# Patient Record
Sex: FEMALE | Race: WHITE | NOT HISPANIC OR LATINO | Employment: UNEMPLOYED | URBAN - NONMETROPOLITAN AREA
[De-identification: names, ages, dates, MRNs, and addresses within clinical notes are randomized per-mention and may not be internally consistent; named-entity substitution may affect disease eponyms.]

---
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
## 2024-02-27 ENCOUNTER — HOSPITAL ENCOUNTER (EMERGENCY)
Facility: HOSPITAL | Age: 19
Discharge: HOME | End: 2024-02-27
Payer: COMMERCIAL

## 2024-02-27 VITALS
HEART RATE: 91 BPM | BODY MASS INDEX: 18.62 KG/M2 | SYSTOLIC BLOOD PRESSURE: 129 MMHG | TEMPERATURE: 97.1 F | WEIGHT: 133 LBS | HEIGHT: 71 IN | OXYGEN SATURATION: 100 % | RESPIRATION RATE: 16 BRPM | DIASTOLIC BLOOD PRESSURE: 91 MMHG

## 2024-02-27 DIAGNOSIS — H61.23 BILATERAL IMPACTED CERUMEN: ICD-10-CM

## 2024-02-27 DIAGNOSIS — J32.9 SINUSITIS: ICD-10-CM

## 2024-02-27 DIAGNOSIS — H66.91 ACUTE RIGHT OTITIS MEDIA: ICD-10-CM

## 2024-02-27 DIAGNOSIS — J32.9 SINUSITIS, UNSPECIFIED CHRONICITY, UNSPECIFIED LOCATION: Primary | ICD-10-CM

## 2024-02-27 PROCEDURE — G0463 HOSPITAL OUTPT CLINIC VISIT: HCPCS

## 2024-02-27 PROCEDURE — 99213 OFFICE O/P EST LOW 20 MIN: CPT

## 2024-02-27 RX ORDER — FLUTICASONE PROPIONATE 50 MCG
1 SPRAY, SUSPENSION (ML) NASAL DAILY
Qty: 16 G | Refills: 0 | Status: SHIPPED | OUTPATIENT
Start: 2024-02-27 | End: 2024-02-27

## 2024-02-27 RX ORDER — FLUTICASONE PROPIONATE 50 MCG
1 SPRAY, SUSPENSION (ML) NASAL DAILY
Qty: 16 G | Refills: 0 | Status: SHIPPED | OUTPATIENT
Start: 2024-02-27

## 2024-02-27 ASSESSMENT — ENCOUNTER SYMPTOMS
ACTIVITY CHANGE: 0
CHILLS: 1
APPETITE CHANGE: 1
SHORTNESS OF BREATH: 0
FEVER: 0
SORE THROAT: 0
NAUSEA: 0
VOMITING: 0
COUGH: 1
DIARRHEA: 0
ABDOMINAL PAIN: 0
SINUS PAIN: 1
SINUS PRESSURE: 1

## 2024-02-27 ASSESSMENT — ACTIVITIES OF DAILY LIVING (ADL): ADLS_ACUITY_SCORE: 35

## 2024-02-27 NOTE — Clinical Note
Екатерина Mccann was seen and treated in our emergency department on 2/27/2024.  She may return to work on 03/01/2024.       If you have any questions or concerns, please don't hesitate to call.      Brissa Snowden, NP

## 2024-02-28 NOTE — ED PROVIDER NOTES
History     Chief Complaint   Patient presents with    Sinusitis     HPI  Oroz Mccann is a 19 year old female who presents to the urgent care with a 7 day history of sinus pressure, headaches, bilateral ear pain, chills, and decreased oral intake. She denies fevers, abd pain, and n/v/d. Has been attempting dayquil, ibuprofen, nasal spray, and homeopathic medicines with minimal change. No recent abx. Non smoker. No hx of asthma or COPD.      Allergies:  Allergies   Allergen Reactions    Mtx Topical Pain [Lidocaine-Menthol]        Problem List:    Patient Active Problem List    Diagnosis Date Noted    Other fatigue 03/31/2021     Priority: Medium     Formatting of this note might be different from the original.  Iron deficiency      Iron deficiency anemia 03/10/2021     Priority: Medium    Granuloma annulare 03/04/2008     Priority: Medium     Overview:   IMO Update 10/11          Past Medical History:    Past Medical History:   Diagnosis Date    Essential hypertension 3/9/2021       Past Surgical History:    Past Surgical History:   Procedure Laterality Date    ENT SURGERY      bilatral pe tubes        Family History:    No family history on file.    Social History:  Marital Status:  Single [1]  Social History     Tobacco Use    Smoking status: Never    Smokeless tobacco: Never   Vaping Use    Vaping Use: Never used   Substance Use Topics    Alcohol use: No    Drug use: No        Medications:    amoxicillin-clavulanate (AUGMENTIN) 875-125 MG tablet  ferrous sulfate (FE TABS) 325 (65 Fe) MG EC tablet  fluticasone (FLONASE) 50 MCG/ACT nasal spray  norgestimate-ethinyl estradiol (ORTHO-CYCLEN) 0.25-35 MG-MCG tablet          Review of Systems   Constitutional:  Positive for appetite change and chills. Negative for activity change and fever.   HENT:  Positive for congestion, ear pain, sinus pressure and sinus pain. Negative for sore throat.    Respiratory:  Positive for cough. Negative for shortness of  "breath.    Gastrointestinal:  Negative for abdominal pain, diarrhea, nausea and vomiting.   All other systems reviewed and are negative.      Physical Exam   BP: 129/91  Pulse: 91  Temp: 97.1  F (36.2  C)  Resp: 16  Height: 180.3 cm (5' 11\")  Weight: 60.3 kg (133 lb)  SpO2: 100 %      Physical Exam  Vitals and nursing note reviewed.   Constitutional:       General: She is not in acute distress.     Appearance: Normal appearance. She is ill-appearing. She is not toxic-appearing or diaphoretic.   HENT:      Right Ear: There is impacted cerumen. Tympanic membrane is erythematous.      Left Ear: There is impacted cerumen.      Nose:      Right Sinus: Maxillary sinus tenderness and frontal sinus tenderness present.      Left Sinus: Maxillary sinus tenderness and frontal sinus tenderness present.      Mouth/Throat:      Mouth: Mucous membranes are moist.      Pharynx: Oropharynx is clear. No oropharyngeal exudate or posterior oropharyngeal erythema.   Cardiovascular:      Rate and Rhythm: Normal rate and regular rhythm.      Pulses: Normal pulses.      Heart sounds: Normal heart sounds.   Pulmonary:      Effort: Pulmonary effort is normal. No respiratory distress.      Breath sounds: Normal breath sounds. No stridor. No wheezing, rhonchi or rales.   Neurological:      Mental Status: She is alert.         ED Course        Procedures           No results found for this or any previous visit (from the past 24 hour(s)).    Medications - No data to display    Assessments & Plan (with Medical Decision Making)     I have reviewed the nursing notes.    I have reviewed the findings, diagnosis, plan and need for follow up with the patient.  Екатерина Gunnbridgettibbiana is a 19 year old female who presents to the urgent care with a 7 day history of sinus pressure, headaches, bilateral ear pain, chills, and decreased oral intake. She denies fevers, abd pain, and n/v/d. Has been attempting dayquil, ibuprofen, nasal spray, and homeopathic " medicines with minimal change. No recent abx. Non smoker. No hx of asthma or COPD.      MDM: vital signs normal, afebrile. Lungs clear, heart tones regular. Bilateral cerumen impaction enough cerumen removed to minimally visualize TMs. Erythema to right. Bilateral frontal and maxillary sinus tenderness. Discussed viral and bacterial etiologies. At this time, most consistent with viral given her length of symptoms. She declines covid multiplex testing. Flonase and Augmentin prescribed. Supportive measures and return precautions discussed. She is in agreement with plan.     (J32.9) Sinusitis, (H61.23) Bilateral impacted cerumen, (J32.9) Sinusitis,(H66.91) Acute right otitis media  Plan: Flonase nasal spray once daily for 5 days. Antibiotics twice daily for 7 days. Yogurt or probiotic while taking.   Push fluids.   Tylenol and ibuprofen as needed.   Return with any concerns. Follow up in the clinic as needed. Understanding verbalized.     Current Discharge Medication List        START taking these medications    Details   amoxicillin-clavulanate (AUGMENTIN) 875-125 MG tablet Take 1 tablet by mouth 2 times daily  Qty: 14 tablet, Refills: 0      fluticasone (FLONASE) 50 MCG/ACT nasal spray Spray 1 spray into both nostrils daily  Qty: 16 g, Refills: 0             Final diagnoses:   Sinusitis   Bilateral impacted cerumen   Acute right otitis media       2/27/2024   [facility]       Brissa Snowden NP  02/27/24 1956

## 2024-02-28 NOTE — ED TRIAGE NOTES
Patient presents to urgent care for sinus pressure, behind her eyes, her ears hurt, her head hurts for 4 days.   Patient refused Strep & multiplex testing tonight at this visit.

## 2024-02-28 NOTE — DISCHARGE INSTRUCTIONS
Flonase nasal spray once daily for 5 days. Antibiotics twice daily for 7 days. Yogurt or probiotic while taking.   Push fluids.   Tylenol and ibuprofen as needed.   Return with any concerns. Follow up in the clinic as needed.

## 2024-03-08 ENCOUNTER — HOSPITAL ENCOUNTER (EMERGENCY)
Facility: HOSPITAL | Age: 19
Discharge: HOME | End: 2024-03-08
Attending: PHYSICIAN ASSISTANT | Admitting: PHYSICIAN ASSISTANT
Payer: COMMERCIAL

## 2024-03-08 VITALS
WEIGHT: 133 LBS | BODY MASS INDEX: 18.62 KG/M2 | SYSTOLIC BLOOD PRESSURE: 135 MMHG | RESPIRATION RATE: 16 BRPM | OXYGEN SATURATION: 97 % | HEART RATE: 89 BPM | HEIGHT: 71 IN | DIASTOLIC BLOOD PRESSURE: 85 MMHG | TEMPERATURE: 97.3 F

## 2024-03-08 DIAGNOSIS — L27.0 DRUG ERUPTION: ICD-10-CM

## 2024-03-08 LAB
GROUP A STREP BY PCR: NOT DETECTED
HOLD SPECIMEN: NORMAL
MONOCYTES NFR BLD AUTO: NEGATIVE %

## 2024-03-08 PROCEDURE — G0463 HOSPITAL OUTPT CLINIC VISIT: HCPCS

## 2024-03-08 PROCEDURE — 87651 STREP A DNA AMP PROBE: CPT | Performed by: PHYSICIAN ASSISTANT

## 2024-03-08 PROCEDURE — 36415 COLL VENOUS BLD VENIPUNCTURE: CPT | Performed by: PHYSICIAN ASSISTANT

## 2024-03-08 PROCEDURE — 99213 OFFICE O/P EST LOW 20 MIN: CPT | Performed by: PHYSICIAN ASSISTANT

## 2024-03-08 PROCEDURE — 86308 HETEROPHILE ANTIBODY SCREEN: CPT | Performed by: PHYSICIAN ASSISTANT

## 2024-03-08 RX ORDER — PREDNISONE 10 MG/1
TABLET ORAL
Qty: 7 TABLET | Refills: 0 | Status: SHIPPED | OUTPATIENT
Start: 2024-03-08 | End: 2024-03-14

## 2024-03-08 ASSESSMENT — ACTIVITIES OF DAILY LIVING (ADL): ADLS_ACUITY_SCORE: 35

## 2024-03-08 NOTE — DISCHARGE INSTRUCTIONS
Take your claritin 1 tablet daily until rash has cleared.   Take the Prednisone as prescribed to help clear the rash.  May also take Benadryl as directed for itching.   Return here with any new or worsening symptoms including oral swelling, wheezing, or difficulty breathing.

## 2024-03-08 NOTE — ED TRIAGE NOTES
Patient presents to urgent care for rash all over her body. Patient was seen for a ear infection 2/27 and was started on augmentin 875-125. Patient noticed a rash 2 days ago and it spread quickly per patient.  Patient reports it itches between the breast and around her neck area. Patient stated she did change her deodorant and noticed a rash under her right armpit.

## 2024-03-08 NOTE — ED PROVIDER NOTES
"  History     Chief Complaint   Patient presents with    Rash     HPI  Екатерина Mccann is a 19 year old female who presents with rash that began in her armpits and now is over her entire body x since yesterday. She has completed 9/10 days of treatment for ear infection with Augmentin. Denies oral swelling, wheezing, or difficulty breathing. Rash is slightly itchy.     Allergies:  Allergies   Allergen Reactions    Mtx Topical Pain [Lidocaine-Menthol]     Augmentin [Amoxicillin-Pot Clavulanate] Rash       Problem List:    Patient Active Problem List    Diagnosis Date Noted    Other fatigue 03/31/2021     Priority: Medium     Formatting of this note might be different from the original.  Iron deficiency      Iron deficiency anemia 03/10/2021     Priority: Medium    Granuloma annulare 03/04/2008     Priority: Medium     Overview:   IMO Update 10/11          Past Medical History:    Past Medical History:   Diagnosis Date    Essential hypertension 3/9/2021       Past Surgical History:    Past Surgical History:   Procedure Laterality Date    ENT SURGERY      bilatral pe tubes        Family History:    No family history on file.    Social History:  Marital Status:  Single [1]  Social History     Tobacco Use    Smoking status: Never    Smokeless tobacco: Never   Vaping Use    Vaping Use: Never used   Substance Use Topics    Alcohol use: No    Drug use: No        Medications:    ferrous sulfate (FE TABS) 325 (65 Fe) MG EC tablet  fluticasone (FLONASE) 50 MCG/ACT nasal spray  norgestimate-ethinyl estradiol (ORTHO-CYCLEN) 0.25-35 MG-MCG tablet  predniSONE (DELTASONE) 10 MG tablet          Review of Systems   All other systems reviewed and are negative.      Physical Exam   BP: 135/85  Pulse: 89  Temp: 97.3  F (36.3  C)  Resp: 16  Height: 180.3 cm (5' 11\")  Weight: 60.3 kg (133 lb)  SpO2: 97 %      Physical Exam  Vitals and nursing note reviewed.   Constitutional:       General: She is not in acute distress.     " Appearance: She is well-developed. She is not diaphoretic.   HENT:      Head: Normocephalic and atraumatic.      Right Ear: External ear normal. There is impacted cerumen.      Left Ear: External ear normal. There is impacted cerumen.      Nose: Nose normal.      Mouth/Throat:      Pharynx: Oropharynx is clear. Uvula midline. Posterior oropharyngeal erythema present. No pharyngeal swelling or oropharyngeal exudate.      Tonsils: No tonsillar exudate or tonsillar abscesses. 0 on the left.   Eyes:      General: No scleral icterus.        Right eye: No discharge.         Left eye: No discharge.      Conjunctiva/sclera: Conjunctivae normal.      Pupils: Pupils are equal, round, and reactive to light.   Cardiovascular:      Rate and Rhythm: Normal rate and regular rhythm.      Heart sounds: Normal heart sounds. No murmur heard.     No friction rub. No gallop.   Pulmonary:      Effort: Pulmonary effort is normal. No respiratory distress.      Breath sounds: Normal breath sounds. No wheezing or rales.   Chest:      Chest wall: No tenderness.   Musculoskeletal:      Cervical back: Normal range of motion and neck supple.   Lymphadenopathy:      Cervical: No cervical adenopathy.   Skin:     General: Skin is warm and dry.      Coloration: Skin is not pale.      Findings: Rash (1-2mm erythemaous papules scatter on pt from head to toe. Blanches.) present. No erythema.   Neurological:      Mental Status: She is alert and oriented to person, place, and time.      Cranial Nerves: No cranial nerve deficit.      Coordination: Coordination normal.   Psychiatric:         Behavior: Behavior normal.         Thought Content: Thought content normal.         Judgment: Judgment normal.         ED Course        Procedures       Results for orders placed or performed during the hospital encounter of 03/08/24 (from the past 24 hour(s))   Group A Streptococcus PCR Throat Swab    Specimen: Throat; Swab   Result Value Ref Range    Group A strep by  PCR Not Detected Not Detected    Narrative    The Xpert Xpress Strep A test, performed on the [employer]  Instrument Systems, is a rapid, qualitative in vitro diagnostic test for the detection of Streptococcus pyogenes (Group A ß-hemolytic Streptococcus, Strep A) in throat swab specimens from patients with signs and symptoms of pharyngitis. The Xpert Xpress Strep A test can be used as an aid in the diagnosis of Group A Streptococcal pharyngitis. The assay is not intended to monitor treatment for Group A Streptococcus infections. The Xpert Xpress Strep A test utilizes an automated real-time polymerase chain reaction (PCR) to detect Streptococcus pyogenes DNA.   Mononucleosis screen   Result Value Ref Range    Mononucleosis Screen Negative Negative   Extra Tube    Narrative    The following orders were created for panel order Extra Tube.  Procedure                               Abnormality         Status                     ---------                               -----------         ------                     Extra Blue Top Tube[[id number]]                              In process                 Extra Red Top Tube[[id number]]                               In process                 Extra Green Top (Lithium...[[id number]]                      In process                 Extra Heparinized Syringe[[id number]]                        In process                   Please view results for these tests on the individual orders.       Medications - No data to display    Assessments & Plan (with Medical Decision Making)   Pt with papular erythematous rash from head to toe with urticaria. No oral or airway involvement. No signs of anaphylaxis. Mono and strep are negative. Therefore I suspect this is an allergy to augmentin. She will stop this today. Ears no longer hurt. Cerumen impaction bilaterally, unable to see TMs. RX for Prednisone was provided to take in addition to Claritin and Benadryl. Pt was discharged home following in stable  condition.     Plan:  Take your claritin 1 tablet daily until rash has cleared.   Take the Prednisone as prescribed to help clear the rash.  May also take Benadryl as directed for itching.   Return here with any new or worsening symptoms including oral swelling, wheezing, or difficulty breathing.     I have reviewed the nursing notes.    I have reviewed the findings, diagnosis, plan and need for follow up with the patient.      New Prescriptions    PREDNISONE (DELTASONE) 10 MG TABLET    Two tablets daily for 2 days, then one tab for two days, the 1/2 tab daily for 2 days       Final diagnoses:   Drug eruption       3/8/2024   [facility]

## 2024-03-08 NOTE — Clinical Note
Екатерина Mccann was seen and treated in our emergency department on 3/8/2024.  She may return to work on 03/10/2024.       If you have any questions or concerns, please don't hesitate to call.      Arabella Oliva PA-C

## 2024-03-10 ENCOUNTER — TRANSFERRED RECORDS (OUTPATIENT)
Dept: HEALTH INFORMATION MANAGEMENT | Facility: CLINIC | Age: 19
End: 2024-03-10

## 2024-11-11 ENCOUNTER — TELEPHONE (OUTPATIENT)
Dept: FAMILY MEDICINE | Facility: OTHER | Age: 19
End: 2024-11-11

## 2024-11-11 DIAGNOSIS — Z30.09 BIRTH CONTROL COUNSELING: ICD-10-CM

## 2024-11-11 RX ORDER — NORGESTIMATE AND ETHINYL ESTRADIOL 0.25-0.035
1 KIT ORAL DAILY
Qty: 84 TABLET | Refills: 1 | Status: SHIPPED | OUTPATIENT
Start: 2024-11-11

## 2024-11-11 NOTE — TELEPHONE ENCOUNTER
Filled at [facility] for 6 months  Will need appt before next refill    Kate Munoz [facility]  [phone]

## 2024-11-11 NOTE — TELEPHONE ENCOUNTER
1:54 PM    Reason for Call: Phone Call    Description: Patient called in stating that she just moved down to [facility] and lost her prescription for birth control in the move. Patient would like a prescription sent to [facility] [id number], [address] [place] [address]. Please call patient back with any questions.     Was an appointment offered for this call? No  If yes : Appointment type              Date    Preferred method for responding to this message: Telephone Call  What is your phone number ? [phone]     If we cannot reach you directly, may we leave a detailed response at the number you provided? Yes    Can this message wait until your PCP/provider returns, if available today? Not applicable, PROVIDER IN CLINIC    Kajal House

## 2025-08-13 ENCOUNTER — OFFICE VISIT (OUTPATIENT)
Dept: FAMILY MEDICINE | Facility: OTHER | Age: 20
End: 2025-08-13
Attending: NURSE PRACTITIONER
Payer: COMMERCIAL

## 2025-08-13 VITALS
SYSTOLIC BLOOD PRESSURE: 111 MMHG | HEIGHT: 71 IN | HEART RATE: 86 BPM | WEIGHT: 149 LBS | OXYGEN SATURATION: 98 % | DIASTOLIC BLOOD PRESSURE: 69 MMHG | RESPIRATION RATE: 16 BRPM | TEMPERATURE: 98 F | BODY MASS INDEX: 20.86 KG/M2

## 2025-08-13 DIAGNOSIS — Z11.8 SCREENING FOR CHLAMYDIAL DISEASE: Primary | ICD-10-CM

## 2025-08-13 DIAGNOSIS — D50.8 OTHER IRON DEFICIENCY ANEMIA: ICD-10-CM

## 2025-08-13 DIAGNOSIS — Z30.09 BIRTH CONTROL COUNSELING: ICD-10-CM

## 2025-08-13 LAB
BASOPHILS # BLD AUTO: 0.06 10E3/UL (ref 0–0.2)
BASOPHILS NFR BLD AUTO: 0.7 %
C TRACH DNA SPEC QL PROBE+SIG AMP: NEGATIVE
EOSINOPHIL # BLD AUTO: 0.09 10E3/UL (ref 0–0.7)
EOSINOPHIL NFR BLD AUTO: 1 %
ERYTHROCYTE [DISTWIDTH] IN BLOOD BY AUTOMATED COUNT: 12.9 % (ref 10–15)
FERRITIN SERPL-MCNC: 25 NG/ML (ref 6–175)
HCT VFR BLD AUTO: 43.3 % (ref 35–47)
HGB BLD-MCNC: 14.5 G/DL (ref 11.7–15.7)
IMM GRANULOCYTES # BLD: <0.04 10E3/UL
IMM GRANULOCYTES NFR BLD: 0.1 %
IRON BINDING CAPACITY (ROCHE): 383 UG/DL (ref 240–430)
IRON SATN MFR SERPL: 21 % (ref 15–46)
IRON SERPL-MCNC: 79 UG/DL (ref 37–145)
LYMPHOCYTES # BLD AUTO: 2.73 10E3/UL (ref 0.8–5.3)
LYMPHOCYTES NFR BLD AUTO: 30.7 %
MCH RBC QN AUTO: 30.5 PG (ref 26.5–33)
MCHC RBC AUTO-ENTMCNC: 33.5 G/DL (ref 31.5–36.5)
MCV RBC AUTO: 91 FL (ref 78–100)
MONOCYTES # BLD AUTO: 0.57 10E3/UL (ref 0–1.3)
MONOCYTES NFR BLD AUTO: 6.4 %
N GONORRHOEA DNA SPEC QL NAA+PROBE: NEGATIVE
NEUTROPHILS # BLD AUTO: 5.44 10E3/UL (ref 1.6–8.3)
NEUTROPHILS NFR BLD AUTO: 61.1 %
PLATELET # BLD AUTO: 196 10E3/UL (ref 150–450)
RBC # BLD AUTO: 4.76 10E6/UL (ref 3.8–5.2)
SPECIMEN TYPE: NORMAL
WBC # BLD AUTO: 8.9 10E3/UL (ref 4–11)

## 2025-08-13 RX ORDER — NORGESTIMATE AND ETHINYL ESTRADIOL 0.25-0.035
1 KIT ORAL DAILY
Qty: 84 TABLET | Refills: 3 | Status: SHIPPED | OUTPATIENT
Start: 2025-08-13

## 2025-08-13 ASSESSMENT — PAIN SCALES - GENERAL: PAINLEVEL_OUTOF10: NO PAIN (0)

## 2025-08-13 ASSESSMENT — PATIENT HEALTH QUESTIONNAIRE - PHQ9
SUM OF ALL RESPONSES TO PHQ QUESTIONS 1-9: 7
10. IF YOU CHECKED OFF ANY PROBLEMS, HOW DIFFICULT HAVE THESE PROBLEMS MADE IT FOR YOU TO DO YOUR WORK, TAKE CARE OF THINGS AT HOME, OR GET ALONG WITH OTHER PEOPLE: SOMEWHAT DIFFICULT
SUM OF ALL RESPONSES TO PHQ QUESTIONS 1-9: 7

## 2025-08-14 LAB — FOLATE SERPL-MCNC: 16.5 NG/ML (ref 4.6–34.8)
